# Patient Record
Sex: FEMALE | Race: BLACK OR AFRICAN AMERICAN | URBAN - METROPOLITAN AREA
[De-identification: names, ages, dates, MRNs, and addresses within clinical notes are randomized per-mention and may not be internally consistent; named-entity substitution may affect disease eponyms.]

---

## 2021-10-14 ENCOUNTER — OFFICE VISIT (OUTPATIENT)
Dept: OTOLARYNGOLOGY | Facility: CLINIC | Age: 41
End: 2021-10-14
Payer: COMMERCIAL

## 2021-10-14 VITALS — TEMPERATURE: 97 F | WEIGHT: 222 LBS | BODY MASS INDEX: 36.99 KG/M2 | HEIGHT: 65 IN

## 2021-10-14 DIAGNOSIS — R07.0 THROAT PAIN: Primary | ICD-10-CM

## 2021-10-14 DIAGNOSIS — R49.0 DYSPHONIA: ICD-10-CM

## 2021-10-14 PROCEDURE — 99203 OFFICE O/P NEW LOW 30 MIN: CPT | Performed by: OTOLARYNGOLOGY

## 2021-10-14 PROCEDURE — 3008F BODY MASS INDEX DOCD: CPT | Performed by: OTOLARYNGOLOGY

## 2021-10-14 RX ORDER — AZELASTINE 1 MG/ML
2 SPRAY, METERED NASAL 2 TIMES DAILY
Qty: 30 ML | Refills: 0 | Status: SHIPPED | OUTPATIENT
Start: 2021-10-14

## 2021-10-14 RX ORDER — MONTELUKAST SODIUM 10 MG/1
10 TABLET ORAL NIGHTLY
Qty: 30 TABLET | Refills: 3 | Status: SHIPPED | OUTPATIENT
Start: 2021-10-14

## 2021-10-14 RX ORDER — CYCLOBENZAPRINE HCL 5 MG
5 TABLET ORAL NIGHTLY
Qty: 30 TABLET | Refills: 1 | Status: SHIPPED | OUTPATIENT
Start: 2021-10-14

## 2021-10-14 RX ORDER — PREDNISONE 20 MG/1
20 TABLET ORAL 2 TIMES DAILY
COMMUNITY
Start: 2021-08-28

## 2021-10-14 RX ORDER — MELOXICAM 15 MG/1
15 TABLET ORAL DAILY
Qty: 30 TABLET | Refills: 3 | Status: SHIPPED | OUTPATIENT
Start: 2021-10-14

## 2021-10-14 RX ORDER — HYDROXYCHLOROQUINE SULFATE 200 MG/1
TABLET, FILM COATED ORAL
COMMUNITY
Start: 2020-09-21

## 2021-10-14 RX ORDER — AMOXICILLIN AND CLAVULANATE POTASSIUM 875; 125 MG/1; MG/1
1 TABLET, FILM COATED ORAL AS DIRECTED
COMMUNITY

## 2021-10-14 RX ORDER — ACETAMINOPHEN 325 MG/1
TABLET ORAL
COMMUNITY
Start: 2020-08-18

## 2021-10-14 RX ORDER — NIFEDIPINE 30 MG/1
30 TABLET, FILM COATED, EXTENDED RELEASE ORAL DAILY
COMMUNITY
Start: 2021-08-28

## 2021-10-15 NOTE — PROGRESS NOTES
Dora Downs is a 36year old female. Patient presents with:  Throat Problem: throat pain on side of neck, comes and goes , no difficulty swallowing, neck feels stiff when turning   Neck Pain      HISTORY OF PRESENT ILLNESS  was 7 Month(s) ago.   The sev and weight loss. ENMT Negative Drooling. Eyes Negative Blurred vision and vision changes. Respiratory Negative Dyspnea and wheezing. Cardio Negative Chest pain, irregular heartbeat/palpitations and syncope.    GI Negative Abdominal pain and diarrhea Medications:   •  acetaminophen 325 MG Oral Tab, acetaminophen 325 mg tablet  TK 2 TS PO Q 6 H, Disp: , Rfl:   •  Hydroxychloroquine Sulfate 200 MG Oral Tab, hydroxychloroquine 200 mg tablet  TAKE 1 TABLET BY MOUTH TWICE DAILY, Disp: , Rfl:   •  NIFEdipine

## 2021-10-18 ENCOUNTER — TELEPHONE (OUTPATIENT)
Dept: OTOLARYNGOLOGY | Facility: CLINIC | Age: 41
End: 2021-10-18

## 2021-10-19 NOTE — TELEPHONE ENCOUNTER
Rn called patient again to get an update of pt insurance stated she is waiting for an email  And will call back to update pt insurance.

## 2021-10-30 ENCOUNTER — TELEPHONE (OUTPATIENT)
Dept: OTOLARYNGOLOGY | Facility: CLINIC | Age: 41
End: 2021-10-30

## 2021-10-30 NOTE — TELEPHONE ENCOUNTER
Jame Aldana MD  P  [facility] [employer]  Please make sure the patient has her insurance issues addressed so that she may be authorized for laryngoscopy when she returns to see me. Brett Landry you very much

## 2021-11-15 ENCOUNTER — TELEPHONE (OUTPATIENT)
Dept: OTOLARYNGOLOGY | Facility: CLINIC | Age: 41
End: 2021-11-15

## 2021-11-15 NOTE — TELEPHONE ENCOUNTER
Pt calling states she has a referral for multiple visit explained nothing on file or in system and I spoke with  no referral on file ever scanned in or on fille asked to call PCP office to obtain please advise

## 2021-11-22 ENCOUNTER — OFFICE VISIT (OUTPATIENT)
Dept: OTOLARYNGOLOGY | Facility: CLINIC | Age: 41
End: 2021-11-22
Payer: COMMERCIAL

## 2021-11-22 VITALS — HEIGHT: 65 IN | BODY MASS INDEX: 36.99 KG/M2 | WEIGHT: 222 LBS

## 2021-11-22 DIAGNOSIS — R49.0 DYSPHONIA: Primary | ICD-10-CM

## 2021-11-22 DIAGNOSIS — R59.1 LYMPHADENOPATHY OF HEAD AND NECK: ICD-10-CM

## 2021-11-22 DIAGNOSIS — R07.0 THROAT PAIN: ICD-10-CM

## 2021-11-22 PROCEDURE — 99213 OFFICE O/P EST LOW 20 MIN: CPT | Performed by: OTOLARYNGOLOGY

## 2021-11-22 PROCEDURE — 3008F BODY MASS INDEX DOCD: CPT | Performed by: OTOLARYNGOLOGY

## 2021-11-22 RX ORDER — CELECOXIB 200 MG/1
200 CAPSULE ORAL DAILY PRN
Qty: 30 CAPSULE | Refills: 1 | Status: SHIPPED | OUTPATIENT
Start: 2021-11-22 | End: 2021-12-22

## 2021-11-22 RX ORDER — MONTELUKAST SODIUM 10 MG/1
10 TABLET ORAL NIGHTLY
Qty: 30 TABLET | Refills: 3 | Status: SHIPPED | OUTPATIENT
Start: 2021-11-22

## 2021-11-23 NOTE — PROGRESS NOTES
Corazon Mitchell is a 36year old female. Patient presents with:   Follow - Up: Pt is here to discuss ct scan that was done on 11/11 of her neck , Pt was told she has swollen lymph nodes       HISTORY OF PRESENT ILLNESS    was 7 Month(s) ago. Jose Manuel Guerra severity i History      Marital status:     Tobacco Use      Smoking status: Never Smoker      Smokeless tobacco: Never Used    Substance and Sexual Activity      Alcohol use: Not Currently      History reviewed. No pertinent family history.     Past Medical Hi Submental. Submandibular. Anterior cervical. Posterior cervical. Supraclavicular.         Nose/Mouth/Throat Normal External nose - Normal. Lips/teeth/gums - Normal. Tonsils - Normal. Oropharynx - Normal.   Nose/Mouth/Throat Normal Nares - Right: Normal Left cyclobenzaprine for appears to be some musculoskeletal symptoms. I will also start her on Astelin Singulair and loratadine D for nasal congestive issues and postnasal discharge. She will return to see me in 1 month for reevaluation.         This note was

## 2021-12-13 ENCOUNTER — OFFICE VISIT (OUTPATIENT)
Dept: OTOLARYNGOLOGY | Facility: CLINIC | Age: 41
End: 2021-12-13
Payer: COMMERCIAL

## 2021-12-13 VITALS — BODY MASS INDEX: 36.99 KG/M2 | WEIGHT: 222 LBS | HEIGHT: 65 IN

## 2021-12-13 DIAGNOSIS — R59.1 LYMPHADENOPATHY OF HEAD AND NECK: Primary | ICD-10-CM

## 2021-12-13 PROCEDURE — 99213 OFFICE O/P EST LOW 20 MIN: CPT | Performed by: OTOLARYNGOLOGY

## 2021-12-13 PROCEDURE — 3008F BODY MASS INDEX DOCD: CPT | Performed by: OTOLARYNGOLOGY

## 2021-12-13 RX ORDER — METOPROLOL SUCCINATE 50 MG/1
50 TABLET, EXTENDED RELEASE ORAL DAILY
COMMUNITY

## 2021-12-13 RX ORDER — PANTOPRAZOLE SODIUM 40 MG/1
40 TABLET, DELAYED RELEASE ORAL
COMMUNITY

## 2021-12-13 NOTE — PROGRESS NOTES
Radhika Thompson is a 36year old female.   Patient presents with:  Throat Problem: f/u throat pain , per pt resolved and very minimal   Lymph Node: f/u lymph node swelling, per pt currenlty no swelling       HISTORY OF PRESENT ILLNESS  quality as low pitched some anterior neck muscular symptoms. In addition started on Astelin Singulair loratadine for hoarseness and voice changes. No smoking better but still has not been able to sing as well as she normally would.   She states she has not been able to sing wel Palpation - Normal. Parotid gland - Normal. Thyroid gland - Normal.   Eyes Normal Conjunctiva - Right: Normal, Left: Normal. Pupil - Right: Normal, Left: Normal. Fundus - Right: Normal, Left: Normal.   Neurological Normal Memory - Normal. Cranial nerves - mouth daily as needed for Pain.  (Patient not taking: Reported on 12/13/2021), Disp: 30 capsule, Rfl: 1  •  acetaminophen 325 MG Oral Tab, acetaminophen 325 mg tablet  TK 2 TS PO Q 6 H (Patient not taking: Reported on 12/13/2021), Disp: , Rfl:   •  amoxicil watch and return to see me if any sudden changes occur with any dizziness. This note was prepared using [employer] voice recognition dictation software. As a result errors may occur. When identified these errors have been corrected.  While every

## (undated) NOTE — LETTER
No referring provider defined for this encounter. 10/14/21        Patient: Evita Handley   YOB: 1980   Date of Visit: 10/14/2021       Dear  Dr. Marlene Fitch MD,      Thank you for referring Evita Handley to my practice.   Pl

## (undated) NOTE — Clinical Note
Please make sure the patient has her insurance issues addressed so that she may be authorized for laryngoscopy when she returns to see me.   Thank you very much